# Patient Record
(demographics unavailable — no encounter records)

---

## 2025-03-05 NOTE — PLAN
[FreeTextEntry1] : Labs and urine test ordered for form PT referral given Will complete forms pending results.

## 2025-03-05 NOTE — HISTORY OF PRESENT ILLNESS
[de-identified] : 41 yr old female with hyperlipidemia, prediabetes here for follow up today.  Needs forms completed for work. Chronic back pain, mostly at site of epidural, when she lifts something heavy. No trauma, no numbness, incontinence, difficulty walking. Exercises regularly.

## 2025-03-18 NOTE — HISTORY OF PRESENT ILLNESS
[PGHxTotal] : 2 [facility] : [facility] [Patient reported PAP Smear was normal] : Patient reported PAP Smear was normal [FreeTextEntry1] : This patient is a 41 year old P2 female who previously was a long standing patient of mine. She has not been seen for more than 3 years and returns to reestablish care. S/p repeat C/S and B/L salpingectomy. Pt reports normal menses. Denies intermenstrual bleeding, itching, burning or abnormal discharge. Pt c/o breast pain prior to menses. She also reports persisting back pain for the past three years. Pt states she followed with her PCP and was recommended physical therapy for the back pain but never received a referral. Additionally, pt reports having mood swings and feeling fatigued. She is currently doing yoga and motivation with no improvement in her mood. She also reports hair loss starting three years ago. Pt states her thyroid levels were WNL on her last labwork results w/ PCP. Pt also believes her marriage may be contributing to her mood swings and will be starting couple's counseling soon. No other gyn co.   She notes her mother has breast cancer.  OBHx: C/S x2   [PapSmeardate] : 07/21 [FreeTextEntry3] : tubal ligation

## 2025-03-18 NOTE — PLAN
[FreeTextEntry1] : Pt desires to be connected with behavioral health team and the  will reach out to the patient between 24-48 hours. She is advised to follow with a neurologist for the back pain and endocrinologist for the hair loss. Patient verbalized understanding of all explanations, and her questions were answered, and all concerns were addressed.   Pap/HPV done today Referral for precious/sono given RTO 2 months for f/u

## 2025-03-18 NOTE — ASSESSMENT
[TextEntry] : Pleasant 40 yo female with normal GYN exam except back pain, mood swings and hair loss.

## 2025-03-18 NOTE — HISTORY OF PRESENT ILLNESS
[PGHxTotal] : 2 [[facility] : [facility] [Patient reported PAP Smear was normal] : Patient reported PAP Smear was normal [FreeTextEntry1] : This patient is a 41 year old P2 female who previously was a long standing patient of mine. She has not been seen for more than 3 years and returns to reestablish care. S/p repeat C/S and B/L salpingectomy. Pt reports normal menses. Denies intermenstrual bleeding, itching, burning or abnormal discharge. Pt c/o breast pain prior to menses. She also reports persisting back pain for the past three years. Pt states she followed with her PCP and was recommended physical therapy for the back pain but never received a referral. Additionally, pt reports having mood swings and feeling fatigued. She is currently doing yoga and motivation with no improvement in her mood. She also reports hair loss starting three years ago. Pt states her thyroid levels were WNL on her last labwork results w/ PCP. Pt also believes her marriage may be contributing to her mood swings and will be starting couple's counseling soon. No other gyn co.   She notes her mother has breast cancer.  OBHx: C/S x2   [PapSmeardate] : 07/21 [FreeTextEntry3] : tubal ligation

## 2025-03-18 NOTE — SIGNATURES
[TextEntry] : This note was written by Raissa Sharma on 03/18/2025 actively solely YUDI Sanchez M.D 03/18/2025. All medical record entries made by this scribe were at my  YUDI Sanchez M.D  direction and personally dictated by me on 03/18/2025. I have personally reviewed my chart and agree that the record reflects my personal performance of the history, physical exam, assessment, and plan.

## 2025-03-18 NOTE — HISTORY OF PRESENT ILLNESS
[PGHxTotal] : 2 [[facility] : [facility] [[facility] : 2 [Patient reported PAP Smear was normal] : Patient reported PAP Smear was normal [FreeTextEntry1] : This patient is a 41 year old P2 female who previously was a long standing patient of mine. She has not been seen for more than 3 years and returns to reestablish care. S/p repeat C/S and B/L salpingectomy. Pt reports normal menses. Denies intermenstrual bleeding, itching, burning or abnormal discharge. Pt c/o breast pain prior to menses. She also reports persisting back pain for the past three years. Pt states she followed with her PCP and was recommended physical therapy for the back pain but never received a referral. Additionally, pt reports having mood swings and feeling fatigued. She is currently doing yoga and motivation with no improvement in her mood. She also reports hair loss starting three years ago. Pt states her thyroid levels were WNL on her last labwork results w/ PCP. Pt also believes her marriage may be contributing to her mood swings and will be starting couple's counseling soon. No other gyn co.   She notes her mother has breast cancer.  OBHx: C/S x2   [PapSmeardate] : 07/21 [FreeTextEntry3] : tubal ligation

## 2025-03-18 NOTE — PHYSICAL EXAM

## 2025-03-18 NOTE — HISTORY OF PRESENT ILLNESS
[PGHxTotal] : 2 [facility] [Patient reported PAP Smear was normal] : Patient reported PAP Smear was normal [FreeTextEntry1] : This patient is a 41 year old P2 female who previously was a long standing patient of mine. She has not been seen for more than 3 years and returns to reestablish care. S/p repeat C/S and B/L salpingectomy. Pt reports normal menses. Denies intermenstrual bleeding, itching, burning or abnormal discharge. Pt c/o breast pain prior to menses. She also reports persisting back pain for the past three years. Pt states she followed with her PCP and was recommended physical therapy for the back pain but never received a referral. Additionally, pt reports having mood swings and feeling fatigued. She is currently doing yoga and motivation with no improvement in her mood. She also reports hair loss starting three years ago. Pt states her thyroid levels were WNL on her last labwork results w/ PCP. Pt also believes her marriage may be contributing to her mood swings and will be starting couple's counseling soon. No other gyn co.   She notes her mother has breast cancer.  OBHx: C/S x2   [PapSmeardate] : 07/21 [FreeTextEntry3] : tubal ligation

## 2025-03-18 NOTE — ASSESSMENT
[TextEntry] : Pleasant 42 yo female with normal GYN exam except back pain, mood swings and hair loss.